# Patient Record
Sex: FEMALE
[De-identification: names, ages, dates, MRNs, and addresses within clinical notes are randomized per-mention and may not be internally consistent; named-entity substitution may affect disease eponyms.]

---

## 2018-08-08 ENCOUNTER — HOSPITAL ENCOUNTER (EMERGENCY)
Dept: HOSPITAL 14 - H.ER | Age: 53
Discharge: HOME | End: 2018-08-08
Payer: COMMERCIAL

## 2018-08-08 VITALS
SYSTOLIC BLOOD PRESSURE: 114 MMHG | RESPIRATION RATE: 16 BRPM | TEMPERATURE: 99.1 F | HEART RATE: 76 BPM | DIASTOLIC BLOOD PRESSURE: 74 MMHG | OXYGEN SATURATION: 99 %

## 2018-08-08 DIAGNOSIS — M79.672: Primary | ICD-10-CM

## 2018-08-08 DIAGNOSIS — M79.671: ICD-10-CM

## 2018-08-08 NOTE — ED PDOC
Lower Extremity Pain/Injury


Time Seen by Provider: 18 14:56


Chief Complaint (Nursing): Lower Extremity Problem/Injury


Chief Complaint (Provider): Bilateral Foot Pain


History Per: Patient


History/Exam Limitations: no limitations


Onset/Duration Of Symptoms: Days (x2 weeks)


Current Symptoms Are (Timing): Still Present


Additional Complaint(s): 


52 year old female presents to the ED for evaluation of severe pain to the 

bottoms of both feet. Patient reports for the last two weeks, the pain has been 

constant but worsening, especially with ambulation and standing, which she says 

she does a lot at work. She notes she has a history of similar symptoms, and 

was at one point seeing a podiatrist in [place], but has not seen him in 

over a year. Of note, she states she used to receive injections from her 

podiatrist in the feet which relieved her symptoms.





PMD: none provided





Past Medical History


Reviewed: Historical Data, Nursing Documentation, Vital Signs


Vital Signs: 





 Last Vital Signs











Temp  99.1 F   18 14:50


 


Pulse  76   18 14:50


 


Resp  16   18 14:50


 


BP  114/74   18 14:50


 


Pulse Ox  99   18 14:50














- Medical History


PMH: Hypothyroidism


   Denies: Diabetes, Hepatitis, HIV, HTN, Seizures, Sexually Transmitted Disease





- Surgical History


Surgical History:  (x2)





- Family History


Family History: States: Unknown Family Hx





- Social History


Current smoker - smoking cessation education provided: No


Alcohol: None





- Home Medications


Home Medications: 


 Ambulatory Orders











 Medication  Instructions  Recorded


 


Ibuprofen [Motrin] 600 mg PO Q6 #20 tab 18














- Allergies


Allergies/Adverse Reactions: 


 Allergies











Allergy/AdvReac Type Severity Reaction Status Date / Time


 


No Known Allergies Allergy   Verified 10/07/14 23:23














Review of Systems


ROS Statement: Except As Marked, All Systems Reviewed And Found Negative


Musculoskeletal: Positive for: Foot Pain (sereve pain to bottom of bilat feet)





Physical Exam





- Reviewed


Nursing Documentation Reviewed: Yes


Vital Signs Reviewed: Yes





- Physical Exam


Appears: Positive for: No Acute Distress


Skin: Positive for: Normal Color, Warm, Dry


Cardiovascular/Chest: Positive for: Regular Rate, Rhythm


Respiratory: Positive for: Normal Breath Sounds.  Negative for: Accessory 

Muscle Use, Respiratory Distress


Pulses-Dorsalis Pedis (L): 2+


Pulses-Dorsalis Pedis (R): 2+


Extremity: Positive for: Normal ROM (of bilat LE), Tenderness (to peroneal and 

plantar aspects of bilat feet)


Neurologic/Psych: Positive for: Alert, Oriented (x3).  Negative for: Motor/

Sensory Deficits





- ECG


O2 Sat by Pulse Oximetry: 99 (RA)


Pulse Ox Interpretation: Normal





Medical Decision Making


Medical Decision Making: 


Time: 


Initial Impression: planter fasciitis, concern for heel spurs


Initial Plan:


--XR bilateral foot


--Motrin 600 mg PO





1613


XR feet


FINDINGS:


BONES:


Right Foot: No acute fracture appreciated. . 


Left Foot: No acute fracture appreciated. 


JOINTS:


Right Foot: Arthrosis 


Left Foot: Arthrosis 


SOFT TISSUES:


Right Foot: Normal.


Left Foot: Normal. 


OTHER FINDINGS:


None.


IMPRESSION:


No acute fracture appreciated.


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


The lateral navicular bone appears more narrow than typically seen especially 

in the right side but also to a lesser extent on the left side this could be 

developmental variation. There appearances is not believe the to be an acute 

finding. 


An os peroneum on the right is incidentally noted.


Bilateral calcaneal spurring and bilateral calcaneal cortical hyperostoses.





 


Patient made aware of diagnosis using  [id number]. All questions 

answered at this time.





--------------------------------------------------------------------------------

-----------------


Scribe Attestation:


Documented by Lavonne Camacho, acting as a scribe for Jenna Barroso PA-C.


Provider Scribe Attestation:


All medical record entries made by the Scribe were at my direction and 

personally dictated by me. I have reviewed the chart and agree that the record 

accurately reflects my personal performance of the history, physical exam, 

medical decision making, and the department course for this patient. I have 

also personally directed, reviewed, and agree with the discharge instructions 

and disposition.





Disposition





- Clinical Impression


Clinical Impression: 


 Foot pain, bilateral








- Disposition


Referrals: 


Podiatry Clinic [Outside]


Disposition Time: 17:09


Condition: STABLE


Prescriptions: 


Ibuprofen [Motrin] 600 mg PO Q6 #20 tab


Instructions:  Heel Pain (Caused by Plantar Fasciitis)


Forms:  [facility] (Tristanian)


Print Language: Malay

## 2018-08-08 NOTE — RAD
Date of service: 



08/08/2018



PROCEDURE:  Bilateral Feet Radiographs.



HISTORY:

pain atraumatic



COMPARISON:

None.



FINDINGS:



BONES:

Right Foot: No acute fracture appreciated. . 



Left Foot: No acute fracture appreciated. 



JOINTS:

Right Foot: Arthrosis 



Left Foot: Arthrosis 



SOFT TISSUES:

Right Foot: Normal.



Left Foot: Normal. 



OTHER FINDINGS:

None.



IMPRESSION:

No acute fracture appreciated.



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



The lateral navicular bone appears more narrow than typically seen 

especially in the right side but also to a lesser extent on the left 

side this could be developmental variation. There appearances is not 

believe the to be an acute finding. 



An os peroneum on the right is incidentally noted.



Bilateral calcaneal spurring and bilateral calcaneal cortical 

hyperostoses.